# Patient Record
Sex: FEMALE | Race: OTHER | Employment: OTHER | URBAN - METROPOLITAN AREA
[De-identification: names, ages, dates, MRNs, and addresses within clinical notes are randomized per-mention and may not be internally consistent; named-entity substitution may affect disease eponyms.]

---

## 2022-03-14 ENCOUNTER — CONSULTATION/EVALUATION (OUTPATIENT)
Dept: URBAN - METROPOLITAN AREA CLINIC 35 | Facility: CLINIC | Age: 67
End: 2022-03-14

## 2022-03-14 DIAGNOSIS — H25.811: ICD-10-CM

## 2022-03-14 DIAGNOSIS — H04.123: ICD-10-CM

## 2022-03-14 DIAGNOSIS — H43.812: ICD-10-CM

## 2022-03-14 DIAGNOSIS — H35.363: ICD-10-CM

## 2022-03-14 DIAGNOSIS — H25.812: ICD-10-CM

## 2022-03-14 DIAGNOSIS — H35.372: ICD-10-CM

## 2022-03-14 PROCEDURE — 92136TC INTERFEROMETRY - TECHNICAL COMPONENT

## 2022-03-14 PROCEDURE — 92025CV CORNEAL TOPOGRAPHY, CV

## 2022-03-14 PROCEDURE — 92134 CPTRZ OPH DX IMG PST SGM RTA: CPT

## 2022-03-14 PROCEDURE — 92014 COMPRE OPH EXAM EST PT 1/>: CPT

## 2022-03-14 RX ORDER — PREDNISOLONE ACETATE 10 MG/ML: 1 SUSPENSION/ DROPS OPHTHALMIC

## 2022-03-14 RX ORDER — MOXIFLOXACIN HYDROCHLORIDE 5 MG/ML: 1 SOLUTION/ DROPS OPHTHALMIC

## 2022-03-14 ASSESSMENT — VISUAL ACUITY
OS_AM: 20/25
OD_CC: J2
OD_SC: J3
OD_CC: 20/50
OS_BAT: 20/100
OD_SC: 20/70
OS_SC: J12
OD_BAT: 20/80
OS_SC: 20/50
OS_CC: 20/60
OD_RAM: 20/25

## 2022-03-14 ASSESSMENT — TONOMETRY
OS_IOP_MMHG: 16
OD_IOP_MMHG: 16

## 2022-03-14 NOTE — PATIENT DISCUSSION
Pt. elects ALANA Rao.  Pt. understands and accepts the need for near correction for all near activities after surgery.

## 2022-04-07 ENCOUNTER — SURGERY/PROCEDURE (OUTPATIENT)
Dept: URBAN - METROPOLITAN AREA CLINIC 35 | Facility: CLINIC | Age: 67
End: 2022-04-07

## 2022-04-07 ENCOUNTER — POST-OP (OUTPATIENT)
Dept: URBAN - METROPOLITAN AREA CLINIC 39 | Facility: CLINIC | Age: 67
End: 2022-04-07

## 2022-04-07 DIAGNOSIS — H25.813: ICD-10-CM

## 2022-04-07 DIAGNOSIS — H43.812: ICD-10-CM

## 2022-04-07 DIAGNOSIS — H04.123: ICD-10-CM

## 2022-04-07 DIAGNOSIS — Z96.1: ICD-10-CM

## 2022-04-07 DIAGNOSIS — H35.372: ICD-10-CM

## 2022-04-07 DIAGNOSIS — H25.811: ICD-10-CM

## 2022-04-07 DIAGNOSIS — H35.363: ICD-10-CM

## 2022-04-07 PROCEDURE — 66999LNSR LENSAR LASER FOR CAT SX

## 2022-04-07 PROCEDURE — 65772LRI LRI DURING CAT SX

## 2022-04-07 PROCEDURE — 99024 POSTOP FOLLOW-UP VISIT: CPT

## 2022-04-07 PROCEDURE — 6698454CV REMOVE CATARACT, INSERT LENS,SX ONLY, CUSTOM VISION

## 2022-04-07 ASSESSMENT — VISUAL ACUITY: OS_SC: 20/60

## 2022-04-07 ASSESSMENT — TONOMETRY: OS_IOP_MMHG: 19

## 2022-04-07 NOTE — PATIENT DISCUSSION
Patient advised of the right to post-operative care by the surgeon. Patient is fully informed of, and agreed to, co-management with their primary optometric physician. Post-operative care by the surgeon is not medically necessary and co-management is clinically appropriate. Patient has received itemization of fees related to cataract surgery. Transfer of care letter completed for the patient. Transfer care of left eye to Dr. Haroon Chavarria on 04/07/22. Patient instructed to call immediately if any new distortion, blurring, decreased vision or eye pain.

## 2022-04-14 ENCOUNTER — POST OP/EVAL OF SECOND EYE (OUTPATIENT)
Dept: URBAN - METROPOLITAN AREA CLINIC 39 | Facility: CLINIC | Age: 67
End: 2022-04-14

## 2022-04-14 ENCOUNTER — SURGERY/PROCEDURE (OUTPATIENT)
Dept: URBAN - METROPOLITAN AREA CLINIC 35 | Facility: CLINIC | Age: 67
End: 2022-04-14

## 2022-04-14 DIAGNOSIS — Z96.1: ICD-10-CM

## 2022-04-14 DIAGNOSIS — H25.811: ICD-10-CM

## 2022-04-14 PROCEDURE — 6698454CV REMOVE CATARACT, INSERT LENS,SX ONLY, CUSTOM VISION

## 2022-04-14 PROCEDURE — 99212 OFFICE O/P EST SF 10 MIN: CPT

## 2022-04-14 PROCEDURE — 66999LNSR LENSAR LASER FOR CAT SX

## 2022-04-14 ASSESSMENT — TONOMETRY
OS_IOP_MMHG: 18
OD_IOP_MMHG: 17

## 2022-04-14 ASSESSMENT — VISUAL ACUITY
OD_BAT: 20/80
OS_SC: J6
OS_CC: 20/25+2
OS_SC: 20/30-2

## 2023-08-05 NOTE — PROCEDURE NOTE: SURGICAL
"<span style=""font-weight:bold;""></span><br />"
FAMILY HISTORY:  No pertinent family history in first degree relatives